# Patient Record
Sex: MALE
[De-identification: names, ages, dates, MRNs, and addresses within clinical notes are randomized per-mention and may not be internally consistent; named-entity substitution may affect disease eponyms.]

---

## 2023-08-18 ENCOUNTER — NURSE TRIAGE (OUTPATIENT)
Dept: OTHER | Facility: CLINIC | Age: 58
End: 2023-08-18

## 2023-08-18 NOTE — TELEPHONE ENCOUNTER
Location of patient: [place]    Subjective: Caller states \"pink eye in left eye, swollen shut and tender with mucus. \"     Current Symptoms:   Swollen and tender eye  Itchy eye  Mucus    Onset: 2 days ago; worsening    Associated Symptoms: None    Pain Severity: Denies pain    Temperature: Denies fever     What has been tried: Nothing    Recommended disposition: See in Office Today    Care advice provided, patient verbalizes understanding; denies any other questions or concerns; instructed to call back for any new or worsening symptoms. Patient says he contacted his PCP and they have no appointments available today and was advised to go to a Urgent Care. This triage is a result of a call to [address] [place] [address]. Please do not respond to the triage nurse through this encounter. Any subsequent communication should be directly with the patient.     Reason for Disposition   Eyelids are very swollen (shut or almost)    Protocols used: Eye - Pus or Discharge-ADULT-[place]